# Patient Record
Sex: FEMALE | Race: WHITE | NOT HISPANIC OR LATINO | Employment: FULL TIME | URBAN - METROPOLITAN AREA
[De-identification: names, ages, dates, MRNs, and addresses within clinical notes are randomized per-mention and may not be internally consistent; named-entity substitution may affect disease eponyms.]

---

## 2017-03-27 ENCOUNTER — *BOTOX/DYSPORT (OUTPATIENT)
Dept: URBAN - METROPOLITAN AREA CLINIC 36 | Facility: CLINIC | Age: 62
Setting detail: DERMATOLOGY
End: 2017-03-27

## 2017-03-27 PROCEDURE — OTHER VOLUMA-: OTHER

## 2017-03-27 PROCEDURE — OTHER BOTOX COSMETIC - 2 AREAS *BD*: OTHER

## 2017-03-27 PROCEDURE — OTHER BOTOX VIAL # (100U): OTHER

## 2017-04-19 ENCOUNTER — *BOTOX/DYSPORT (OUTPATIENT)
Dept: URBAN - METROPOLITAN AREA CLINIC 36 | Facility: CLINIC | Age: 62
Setting detail: DERMATOLOGY
End: 2017-04-19

## 2017-04-19 PROCEDURE — OTHER BOTOX VIAL # (100U): OTHER

## 2017-04-19 PROCEDURE — OTHER BOTOX COSMETIC - TOUCH-UP: OTHER

## 2017-07-11 ENCOUNTER — *BOTOX/DYSPORT (OUTPATIENT)
Dept: URBAN - METROPOLITAN AREA CLINIC 31 | Facility: CLINIC | Age: 62
Setting detail: DERMATOLOGY
End: 2017-07-11

## 2017-07-11 PROCEDURE — OTHER BOTOX COSMETIC - 3 AREAS *BD*: OTHER

## 2017-07-11 PROCEDURE — 99213 OFFICE O/P EST LOW 20 MIN: CPT

## 2017-07-11 PROCEDURE — OTHER BOTOX VIAL # (100U): OTHER

## 2017-09-28 ENCOUNTER — RASH (OUTPATIENT)
Dept: URBAN - METROPOLITAN AREA CLINIC 32 | Facility: CLINIC | Age: 62
Setting detail: DERMATOLOGY
End: 2017-09-28

## 2017-09-28 PROCEDURE — 99213 OFFICE O/P EST LOW 20 MIN: CPT

## 2017-09-28 RX ORDER — MOMETASONE FUROATE 1 MG/G
1 APPLICATION CREAM TOPICAL AS DIRECTED
Qty: 1 | Refills: 0
Start: 2017-09-28

## 2017-10-18 ENCOUNTER — *BOTOX/DYSPORT (OUTPATIENT)
Dept: URBAN - METROPOLITAN AREA CLINIC 36 | Facility: CLINIC | Age: 62
Setting detail: DERMATOLOGY
End: 2017-10-18

## 2017-10-18 PROCEDURE — OTHER BOTOX VIAL # (100U): OTHER

## 2017-10-18 PROCEDURE — OTHER BOTOX COSMETIC - 3 AREAS *BD*: OTHER

## 2017-11-28 ENCOUNTER — PSORIASIS (OUTPATIENT)
Dept: URBAN - METROPOLITAN AREA CLINIC 36 | Facility: CLINIC | Age: 62
Setting detail: DERMATOLOGY
End: 2017-11-28

## 2017-11-28 PROBLEM — L82.0 INFLAMED SEBORRHEIC KERATOSIS: Status: RESOLVED | Noted: 2017-11-28

## 2017-11-28 PROCEDURE — 99213 OFFICE O/P EST LOW 20 MIN: CPT

## 2017-11-28 PROCEDURE — 95044 PATCH/APPLICATION TESTS: CPT

## 2017-11-28 PROCEDURE — 17110 DESTRUCT B9 LESION 1-14: CPT

## 2017-11-30 ENCOUNTER — SEE NOTE (OUTPATIENT)
Dept: URBAN - METROPOLITAN AREA CLINIC 36 | Facility: CLINIC | Age: 62
Setting detail: DERMATOLOGY
End: 2017-11-30

## 2017-11-30 PROCEDURE — 99213 OFFICE O/P EST LOW 20 MIN: CPT

## 2017-12-01 ENCOUNTER — FOLLOW UP (OUTPATIENT)
Dept: URBAN - METROPOLITAN AREA CLINIC 32 | Facility: CLINIC | Age: 62
Setting detail: DERMATOLOGY
End: 2017-12-01

## 2017-12-01 PROCEDURE — 96372 THER/PROPH/DIAG INJ SC/IM: CPT

## 2017-12-01 PROCEDURE — 99213 OFFICE O/P EST LOW 20 MIN: CPT

## 2017-12-21 ENCOUNTER — WORRISOME GROWTH - SEE NOTE (OUTPATIENT)
Dept: URBAN - METROPOLITAN AREA CLINIC 36 | Facility: CLINIC | Age: 62
Setting detail: DERMATOLOGY
End: 2017-12-21

## 2017-12-21 PROBLEM — L82.0 INFLAMED SEBORRHEIC KERATOSIS: Status: RESOLVED | Noted: 2017-12-21

## 2017-12-21 PROCEDURE — 17110 DESTRUCT B9 LESION 1-14: CPT

## 2018-03-19 ENCOUNTER — *BOTOX/DYSPORT (OUTPATIENT)
Dept: URBAN - METROPOLITAN AREA CLINIC 36 | Facility: CLINIC | Age: 63
Setting detail: DERMATOLOGY
End: 2018-03-19

## 2018-03-19 PROCEDURE — OTHER BOTOX VIAL # (100U): OTHER

## 2018-03-19 PROCEDURE — OTHER BOTOX COSMETIC - 2 AREAS *BD*: OTHER

## 2018-03-19 PROCEDURE — OTHER VOLUMA-: OTHER

## 2018-03-19 PROCEDURE — OTHER BOTOX COSMETIC - 3 AREAS *BD*: OTHER

## 2018-04-12 ENCOUNTER — *BOTOX/DYSPORT TOUCH-UP (OUTPATIENT)
Dept: URBAN - METROPOLITAN AREA CLINIC 36 | Facility: CLINIC | Age: 63
Setting detail: DERMATOLOGY
End: 2018-04-12

## 2018-04-12 PROCEDURE — OTHER BOTOX VIAL # (100U): OTHER

## 2018-04-12 PROCEDURE — OTHER BOTOX COSMETIC - TOUCH-UP: OTHER

## 2023-07-13 ENCOUNTER — APPOINTMENT (OUTPATIENT)
Dept: URBAN - METROPOLITAN AREA CLINIC 206 | Age: 68
Setting detail: DERMATOLOGY
End: 2023-07-20

## 2023-07-13 DIAGNOSIS — L57.0 ACTINIC KERATOSIS: ICD-10-CM

## 2023-07-13 DIAGNOSIS — D485 NEOPLASM OF UNCERTAIN BEHAVIOR OF SKIN: ICD-10-CM

## 2023-07-13 PROBLEM — D48.5 NEOPLASM OF UNCERTAIN BEHAVIOR OF SKIN: Status: ACTIVE | Noted: 2023-07-13

## 2023-07-13 PROCEDURE — OTHER COUNSELING: OTHER

## 2023-07-13 PROCEDURE — OTHER BIOPSY BY SHAVE METHOD: OTHER

## 2023-07-13 PROCEDURE — OTHER MIPS QUALITY: OTHER

## 2023-07-13 PROCEDURE — 11102 TANGNTL BX SKIN SINGLE LES: CPT

## 2023-07-13 PROCEDURE — OTHER LIQUID NITROGEN: OTHER

## 2023-07-13 PROCEDURE — 17000 DESTRUCT PREMALG LESION: CPT | Mod: 59

## 2023-07-13 ASSESSMENT — LOCATION DETAILED DESCRIPTION DERM
LOCATION DETAILED: RIGHT RADIAL DORSAL HAND
LOCATION DETAILED: RIGHT MEDIAL SUPERIOR CHEST

## 2023-07-13 ASSESSMENT — LOCATION SIMPLE DESCRIPTION DERM
LOCATION SIMPLE: CHEST
LOCATION SIMPLE: RIGHT HAND

## 2023-07-13 ASSESSMENT — LOCATION ZONE DERM
LOCATION ZONE: HAND
LOCATION ZONE: TRUNK

## 2023-07-13 NOTE — PROCEDURE: LIQUID NITROGEN
Render Post-Care Instructions In Note?: yes
Number Of Freeze-Thaw Cycles: 2 freeze-thaw cycles
Detail Level: Detailed
Post-Care Instructions: I reviewed with the patient in detail post-care instructions. Patient is to wear sunprotection (spf 30+) and avoid picking at any of the treated lesions. Patient may apply Vaseline or Aquaphor to crusted or scabbing areas.
Duration Of Freeze Thaw-Cycle (Seconds): 5
Render Note In Bullet Format When Appropriate: No
Application Tool (Optional): Liquid Nitrogen Sprayer
Consent: The patient's consent was obtained including but not limited to risks of crusting, scabbing, blistering, scarring, darker or lighter pigmentary change, recurrence, incomplete removal and infection.

## 2023-07-13 NOTE — HPI: SKIN LESION
How Severe Is Your Skin Lesion?: mild
Is This A New Presentation, Or A Follow-Up?: Skin Lesion
Additional History: Lesions comes and goes

## 2023-09-13 ENCOUNTER — APPOINTMENT (OUTPATIENT)
Dept: URBAN - METROPOLITAN AREA CLINIC 206 | Age: 68
Setting detail: DERMATOLOGY
End: 2023-09-13

## 2023-09-13 DIAGNOSIS — L57.8 OTHER SKIN CHANGES DUE TO CHRONIC EXPOSURE TO NONIONIZING RADIATION: ICD-10-CM

## 2023-09-13 DIAGNOSIS — L82.1 OTHER SEBORRHEIC KERATOSIS: ICD-10-CM

## 2023-09-13 DIAGNOSIS — D22 MELANOCYTIC NEVI: ICD-10-CM

## 2023-09-13 DIAGNOSIS — L57.0 ACTINIC KERATOSIS: ICD-10-CM

## 2023-09-13 PROBLEM — D22.5 MELANOCYTIC NEVI OF TRUNK: Status: ACTIVE | Noted: 2023-09-13

## 2023-09-13 PROCEDURE — 17003 DESTRUCT PREMALG LES 2-14: CPT

## 2023-09-13 PROCEDURE — 17000 DESTRUCT PREMALG LESION: CPT

## 2023-09-13 PROCEDURE — OTHER MIPS QUALITY: OTHER

## 2023-09-13 PROCEDURE — OTHER LIQUID NITROGEN: OTHER

## 2023-09-13 PROCEDURE — OTHER COUNSELING: OTHER

## 2023-09-13 PROCEDURE — OTHER SUNSCREEN RECOMMENDATIONS: OTHER

## 2023-09-13 PROCEDURE — 99213 OFFICE O/P EST LOW 20 MIN: CPT | Mod: 25

## 2023-09-13 ASSESSMENT — LOCATION SIMPLE DESCRIPTION DERM
LOCATION SIMPLE: RIGHT PRETIBIAL REGION
LOCATION SIMPLE: LEFT PRETIBIAL REGION
LOCATION SIMPLE: RIGHT HAND
LOCATION SIMPLE: CHEST
LOCATION SIMPLE: UPPER BACK
LOCATION SIMPLE: RIGHT ANTERIOR NECK

## 2023-09-13 ASSESSMENT — LOCATION DETAILED DESCRIPTION DERM
LOCATION DETAILED: STERNAL NOTCH
LOCATION DETAILED: INFERIOR THORACIC SPINE
LOCATION DETAILED: RIGHT INFERIOR ANTERIOR NECK
LOCATION DETAILED: RIGHT RADIAL DORSAL HAND
LOCATION DETAILED: RIGHT PROXIMAL PRETIBIAL REGION
LOCATION DETAILED: LEFT PROXIMAL PRETIBIAL REGION
LOCATION DETAILED: UPPER STERNUM

## 2023-09-13 ASSESSMENT — LOCATION ZONE DERM
LOCATION ZONE: HAND
LOCATION ZONE: TRUNK
LOCATION ZONE: LEG
LOCATION ZONE: NECK

## 2023-09-13 ASSESSMENT — PAIN INTENSITY VAS: HOW INTENSE IS YOUR PAIN 0 BEING NO PAIN, 10 BEING THE MOST SEVERE PAIN POSSIBLE?: NO PAIN

## 2023-09-13 NOTE — PROCEDURE: MIPS QUALITY
Quality 226: Preventive Care And Screening: Tobacco Use: Screening And Cessation Intervention: Patient screened for tobacco use and is an ex/non-smoker
Detail Level: Detailed
Quality 402: Tobacco Use And Help With Quitting Among Adolescents: Patient screened for tobacco and never smoked
Quality 47: Advance Care Plan: Advance Care Planning discussed and documented; advance care plan or surrogate decision maker documented in the medical record.

## 2023-09-13 NOTE — HPI: FULL BODY SKIN EXAMINATION
How Severe Are Your Spot(S)?: moderate
What Type Of Note Output Would You Prefer (Optional)?: Standard Output
What Is The Reason For Today's Visit?: Full Body Skin Examination
Additional History: Biopsy proven hypertrophic Ak on right radial dorsal hand, recheck today if not resolved plan liquid nitrogen \\Elizabeth recheck Ak on chest treated with liquid nitrogen on 7/13/23

## 2023-11-01 ENCOUNTER — APPOINTMENT (OUTPATIENT)
Dept: URBAN - METROPOLITAN AREA CLINIC 206 | Age: 68
Setting detail: DERMATOLOGY
End: 2023-11-01

## 2023-11-01 DIAGNOSIS — L259 CONTACT DERMATITIS AND OTHER ECZEMA, UNSPECIFIED CAUSE: ICD-10-CM

## 2023-11-01 PROBLEM — L23.9 ALLERGIC CONTACT DERMATITIS, UNSPECIFIED CAUSE: Status: ACTIVE | Noted: 2023-11-01

## 2023-11-01 PROCEDURE — OTHER PRESCRIPTION MEDICATION MANAGEMENT: OTHER

## 2023-11-01 PROCEDURE — OTHER MIPS QUALITY: OTHER

## 2023-11-01 PROCEDURE — 99214 OFFICE O/P EST MOD 30 MIN: CPT

## 2023-11-01 PROCEDURE — OTHER PRESCRIPTION: OTHER

## 2023-11-01 PROCEDURE — OTHER COUNSELING: OTHER

## 2023-11-01 PROCEDURE — OTHER ADDITIONAL NOTES: OTHER

## 2023-11-01 RX ORDER — HYDROCORTISONE 25 MG/G
CREAM TOPICAL BID
Qty: 28.35 | Refills: 0 | Status: ERX | COMMUNITY
Start: 2023-11-01

## 2023-11-01 RX ORDER — PIMECROLIMUS 10 MG/G
CREAM TOPICAL
Qty: 30 | Refills: 1 | Status: ERX | COMMUNITY
Start: 2023-11-01

## 2023-11-01 ASSESSMENT — LOCATION DETAILED DESCRIPTION DERM
LOCATION DETAILED: RIGHT CENTRAL EYEBROW
LOCATION DETAILED: LEFT LATERAL SUPERIOR EYELID

## 2023-11-01 ASSESSMENT — LOCATION SIMPLE DESCRIPTION DERM
LOCATION SIMPLE: RIGHT EYEBROW
LOCATION SIMPLE: LEFT SUPERIOR EYELID

## 2023-11-01 ASSESSMENT — LOCATION ZONE DERM
LOCATION ZONE: FACE
LOCATION ZONE: EYELID

## 2023-11-01 NOTE — PROCEDURE: ADDITIONAL NOTES
Additional Notes: Try to avoid products that might touch your eyes like nail products and any scented or dyed lotions \\nBe cautions with any products like visine that goes on your eyes
Render Risk Assessment In Note?: no
Detail Level: Simple

## 2023-11-01 NOTE — PROCEDURE: PRESCRIPTION MEDICATION MANAGEMENT
Initiate Treatment: Start taking otc allegra 180mg 1-2 times daily\\nUse the hydrocortisone 2.5% cream twice daily x2 weeks. This will work faster but is also stronger \\nUse the elidel 1-2 times daily as needed long term if needed
Render In Strict Bullet Format?: No
Detail Level: Zone
Discontinue Regimen: Stop the phenylephrine
Negative/Unknown

## 2023-11-01 NOTE — HPI: RASH
What Type Of Note Output Would You Prefer (Optional)?: Standard Output
How Severe Is Your Rash?: moderate
Is This A New Presentation, Or A Follow-Up?: Rash
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 \\nSaw dr apgar and he guided her to avoid sns nails\\nShe noticed rash began on right eye then spread to left eye. \\nPt has been taking phenylephrine HCL allergy medication for treatment.\\n\\n[place] the [place]. Has had a blister\\nHas been using visine\\nDoes acrylic nails and polish